# Patient Record
Sex: FEMALE | Race: WHITE | NOT HISPANIC OR LATINO | Employment: UNEMPLOYED | URBAN - METROPOLITAN AREA
[De-identification: names, ages, dates, MRNs, and addresses within clinical notes are randomized per-mention and may not be internally consistent; named-entity substitution may affect disease eponyms.]

---

## 2021-02-10 ENCOUNTER — OFFICE VISIT (OUTPATIENT)
Dept: CARDIOLOGY | Facility: CLINIC | Age: 41
End: 2021-02-10

## 2021-02-10 VITALS
BODY MASS INDEX: 37.69 KG/M2 | SYSTOLIC BLOOD PRESSURE: 110 MMHG | DIASTOLIC BLOOD PRESSURE: 90 MMHG | HEART RATE: 105 BPM | HEIGHT: 64 IN | WEIGHT: 220.8 LBS | OXYGEN SATURATION: 100 %

## 2021-02-10 DIAGNOSIS — E78.2 MIXED HYPERLIPIDEMIA: ICD-10-CM

## 2021-02-10 DIAGNOSIS — E66.3 OVERWEIGHT: ICD-10-CM

## 2021-02-10 DIAGNOSIS — R07.2 PRECORDIAL PAIN: Primary | ICD-10-CM

## 2021-02-10 PROCEDURE — 99203 OFFICE O/P NEW LOW 30 MIN: CPT | Performed by: INTERNAL MEDICINE

## 2021-02-10 PROCEDURE — 93000 ELECTROCARDIOGRAM COMPLETE: CPT | Performed by: INTERNAL MEDICINE

## 2021-02-10 RX ORDER — OMEPRAZOLE 40 MG/1
40 CAPSULE, DELAYED RELEASE ORAL DAILY
COMMUNITY
Start: 2021-01-20 | End: 2022-01-20

## 2021-02-10 RX ORDER — ONDANSETRON HYDROCHLORIDE 8 MG/1
8 TABLET, FILM COATED ORAL
COMMUNITY
Start: 2021-01-15

## 2021-02-10 RX ORDER — DULOXETIN HYDROCHLORIDE 20 MG/1
CAPSULE, DELAYED RELEASE ORAL
COMMUNITY

## 2021-02-10 RX ORDER — AMLODIPINE BESYLATE 2.5 MG/1
2.5 TABLET ORAL DAILY
Qty: 30 TABLET | Refills: 11 | Status: SHIPPED | OUTPATIENT
Start: 2021-02-10

## 2021-02-10 NOTE — PROGRESS NOTES
Date of Consultation: 02/10/2021    Patient Name: Jessica Healy  :1980    Encounter Provider:Dayton Cruz MD  Primary Care Provider: Hetal Causey MD    Place of Service: [facility] CARDIOLOGY    Chief Complaint   Patient presents with   • Chest Pain       History of Present Illness  The patient is a 40-year-old white female that is here today to discuss an abnormal stress test as well has her ongoing chest discomfort.    Approximately 6 weeks ago the patient noted the onset of left precordial chest discomfort.  She describes it predominantly as an aching sensation that occurs throughout the day.  It is not necessarily provoked by any exertional event.  The duration is variable lasting from a few minutes to much longer.  There is occasional radiation up into the neck with tingling in the jaw.  There is no associated diaphoresis or left arm radiation.  There is nothing that she does to relieve it.  Even sitting office she was complaining of the foot.    The patient has also noted progressive exertional dyspnea since January as well.  She states with any physical activity she just becomes fatigued and short of breath.  She was recently placed on Adderall for ADHD but this has since been discontinued because of significant side effects including hepatic dysfunction.  Some of her symptoms have improved off the medication but her chest discomfort still persists.    The patient was referred to the cardiology service at [facility].  She had a stress Cardiolite study which was abnormal indicating anterior ischemia suggesting traction of her left anterior descending coronary artery.  She was seen by one of the cardiologist there but was not pleased with the responses for the follow-up that she received.  She is here for a second opinion today.      Past Medical History:   Diagnosis Date   • Diabetes (CMS/HCC)    • Hyperlipidemia          Past Surgical  "History:   Procedure Laterality Date   • BREAST MASS EXCISION     •  SECTION     • CHOLECYSTECTOMY     • TONSILLECTOMY             Current Outpatient Medications:   •  omeprazole (priLOSEC) 40 MG capsule, Take 40 mg by mouth Daily., Disp: , Rfl:   •  ondansetron (ZOFRAN) 8 MG tablet, Take 8 mg by mouth., Disp: , Rfl:   •  DULoxetine (CYMBALTA) 20 MG capsule, Take  by mouth., Disp: , Rfl:       Social History     Socioeconomic History   • Marital status:      Spouse name: Not on file   • Number of children: Not on file   • Years of education: Not on file   • Highest education level: Not on file         Review of Systems   Constitution: Positive for malaise/fatigue.   HENT: Negative.    Eyes: Negative.    Cardiovascular: Positive for chest pain, dyspnea on exertion and palpitations.   Respiratory: Negative.    Endocrine: Negative.    Skin: Negative.    Musculoskeletal: Negative.    Gastrointestinal: Negative.    Neurological: Positive for light-headedness and paresthesias.   Psychiatric/Behavioral: Negative.        Procedures      ECG 12 Lead    Date/Time: 2/10/2021 12:11 PM  Performed by: Dayton Cruz MD  Authorized by: Dayton Cruz MD   Comparison: compared with previous ECG from 2021  Similar to previous ECG  Rhythm: sinus rhythm  Rate: normal  Conduction: conduction normal  QRS axis: normal                  Objective:    /90 (BP Location: Left arm, Patient Position: Sitting, Cuff Size: Adult)   Pulse 105   Ht 162.6 cm (64\")   Wt 100 kg (220 lb 12.8 oz)   SpO2 100%   BMI 37.90 kg/m²         Vitals signs reviewed.   Constitutional:       Appearance: Well-developed and overweight.   Eyes:      Pupils: Pupils are equal, round, and reactive to light.   HENT:      Head: Normocephalic.   Neck:      Musculoskeletal: Normal range of motion.      Thyroid: No thyromegaly.      Vascular: No carotid bruit or JVD.   Pulmonary:      Effort: Pulmonary effort is normal.      Breath " sounds: Normal breath sounds.   Cardiovascular:      Normal rate. Regular rhythm.      No gallop.   Pulses:     Intact distal pulses.   Edema:     Peripheral edema absent.   Skin:     General: Skin is warm and dry.      Findings: No erythema.   Neurological:      Mental Status: Alert and oriented to person, place, and time.             Assessment:       Diagnosis Plan   1. Precordial pain     2. Mixed hyperlipidemia     3. Overweight     1.  Chest pain: The patient's symptoms have some cardiac component.  There are other components that do not appear to be cardiac in nature.  I reviewed her Cardiolite report with her and my thoughts.  She mentioned that the [place] cardiologist suggested this was artifact and not true ischemia.  I have asked that [facility] send a copy of the disc so that I can personally review it to see if this is so.  I did explain to her that my index of suspicion however is low.  With that in mind I am going to try her on some amlodipine low dose to see if it makes a difference in how she feels.  We will give it a month or so.  He will allow me time to obtain her study and review it.  We talked about alternatives including a calcium score.  At this point I do not feel that this is urgent.  2.  Obesity: This is a major health risk for her.  At her young age getting her weight under control would be crucial for her regimen.  This should be a major focus for her.  3.  ADHD: Patient with continued concerns.  Did not tolerate Adderall.  Per primary care.       Plan:       1.  Amlodipine 2.5 mg daily  2.  Review study from [facility]  3.  Communicate back with the patient after review of the study.    I appreciate the opportunity to see this patient in consultation

## 2021-02-15 ENCOUNTER — TELEPHONE (OUTPATIENT)
Dept: CARDIOLOGY | Facility: CLINIC | Age: 41
End: 2021-02-15

## 2021-02-15 ENCOUNTER — TELEHEALTH PROVIDED OTHER THAN IN PATIENT'S HOME (OUTPATIENT)
Dept: URBAN - METROPOLITAN AREA TELEHEALTH 4 | Facility: TELEHEALTH | Age: 41
End: 2021-02-15
Payer: COMMERCIAL

## 2021-02-15 VITALS — WEIGHT: 217 LBS | HEIGHT: 64 IN

## 2021-02-15 DIAGNOSIS — R94.5 ABNORMAL RESULTS OF LIVER FUNCTION STUDIES: ICD-10-CM

## 2021-02-15 DIAGNOSIS — K30 FUNCTIONAL DYSPEPSIA: ICD-10-CM

## 2021-02-15 DIAGNOSIS — K59.00 CONSTIPATION, UNSPECIFIED: ICD-10-CM

## 2021-02-15 DIAGNOSIS — R19.4 CHANGE IN BOWEL HABIT: ICD-10-CM

## 2021-02-15 DIAGNOSIS — K62.5 HEMORRHAGE OF ANUS AND RECTUM: ICD-10-CM

## 2021-02-15 PROCEDURE — 99204 OFFICE O/P NEW MOD 45 MIN: CPT | Mod: 95 | Performed by: INTERNAL MEDICINE

## 2021-02-15 RX ORDER — POLYETHYLENE GLYCOL 3350 17 G/17G
17 POWDER, FOR SOLUTION ORAL
Qty: 510 | Refills: 11 | Status: COMPLETED
Start: 2021-02-15 | End: 2021-05-05

## 2021-02-15 NOTE — TELEPHONE ENCOUNTER
Dr Anthony Healy is calling to see if you were able to view the images on her stress test done at [facility]?    PH# [phone]    Thank you  Dena SCHWAB

## 2021-03-12 ENCOUNTER — ON CAMPUS - OUTPATIENT (OUTPATIENT)
Dept: URBAN - METROPOLITAN AREA HOSPITAL 77 | Facility: HOSPITAL | Age: 41
End: 2021-03-12
Payer: COMMERCIAL

## 2021-03-12 DIAGNOSIS — R94.5 ABNORMAL RESULTS OF LIVER FUNCTION STUDIES: ICD-10-CM

## 2021-03-12 DIAGNOSIS — K64.8 OTHER HEMORRHOIDS: ICD-10-CM

## 2021-03-12 DIAGNOSIS — K29.70 GASTRITIS, UNSPECIFIED, WITHOUT BLEEDING: ICD-10-CM

## 2021-03-12 DIAGNOSIS — R19.4 CHANGE IN BOWEL HABIT: ICD-10-CM

## 2021-03-12 DIAGNOSIS — K30 FUNCTIONAL DYSPEPSIA: ICD-10-CM

## 2021-03-12 DIAGNOSIS — K59.00 CONSTIPATION, UNSPECIFIED: ICD-10-CM

## 2021-03-12 PROCEDURE — 45380 COLONOSCOPY AND BIOPSY: CPT | Performed by: INTERNAL MEDICINE

## 2021-03-12 PROCEDURE — 43239 EGD BIOPSY SINGLE/MULTIPLE: CPT | Performed by: INTERNAL MEDICINE

## 2021-05-05 ENCOUNTER — OFFICE (OUTPATIENT)
Dept: URBAN - METROPOLITAN AREA CLINIC 64 | Facility: CLINIC | Age: 41
End: 2021-05-05

## 2021-05-05 VITALS
SYSTOLIC BLOOD PRESSURE: 109 MMHG | HEIGHT: 64 IN | HEART RATE: 105 BPM | DIASTOLIC BLOOD PRESSURE: 80 MMHG | WEIGHT: 226 LBS

## 2021-05-05 DIAGNOSIS — R94.5 ABNORMAL RESULTS OF LIVER FUNCTION STUDIES: ICD-10-CM

## 2021-05-05 DIAGNOSIS — K30 FUNCTIONAL DYSPEPSIA: ICD-10-CM

## 2021-05-05 DIAGNOSIS — K64.8 OTHER HEMORRHOIDS: ICD-10-CM

## 2021-05-05 PROCEDURE — 99214 OFFICE O/P EST MOD 30 MIN: CPT | Performed by: INTERNAL MEDICINE

## 2021-06-08 ENCOUNTER — OFFICE (OUTPATIENT)
Dept: URBAN - METROPOLITAN AREA CLINIC 64 | Facility: CLINIC | Age: 41
End: 2021-06-08

## 2021-06-08 VITALS — HEIGHT: 64 IN

## 2021-06-08 DIAGNOSIS — K64.8 OTHER HEMORRHOIDS: ICD-10-CM

## 2021-06-08 DIAGNOSIS — K64.0 FIRST DEGREE HEMORRHOIDS: ICD-10-CM

## 2021-06-08 PROCEDURE — 46221 LIGATION OF HEMORRHOID(S): CPT | Performed by: INTERNAL MEDICINE

## 2021-07-02 ENCOUNTER — OFFICE (OUTPATIENT)
Dept: URBAN - METROPOLITAN AREA CLINIC 64 | Facility: CLINIC | Age: 41
End: 2021-07-02

## 2021-07-02 VITALS — HEIGHT: 64 IN

## 2021-07-02 DIAGNOSIS — K64.0 FIRST DEGREE HEMORRHOIDS: ICD-10-CM

## 2021-07-02 PROCEDURE — 46221 LIGATION OF HEMORRHOID(S): CPT | Performed by: INTERNAL MEDICINE

## 2021-07-27 ENCOUNTER — OFFICE (OUTPATIENT)
Dept: URBAN - METROPOLITAN AREA CLINIC 64 | Facility: CLINIC | Age: 41
End: 2021-07-27
Payer: COMMERCIAL

## 2021-07-27 VITALS — HEIGHT: 64 IN

## 2021-07-27 DIAGNOSIS — K64.0 FIRST DEGREE HEMORRHOIDS: ICD-10-CM

## 2021-07-27 PROCEDURE — 46221 LIGATION OF HEMORRHOID(S): CPT | Performed by: INTERNAL MEDICINE

## 2021-09-21 ENCOUNTER — TRANSCRIBE ORDERS (OUTPATIENT)
Dept: PHYSICAL THERAPY | Facility: CLINIC | Age: 41
End: 2021-09-21

## 2021-09-21 DIAGNOSIS — M25.551 RIGHT HIP PAIN: Primary | ICD-10-CM

## 2021-10-25 ENCOUNTER — TREATMENT (OUTPATIENT)
Dept: PHYSICAL THERAPY | Facility: CLINIC | Age: 41
End: 2021-10-25

## 2021-10-25 DIAGNOSIS — M25.551 RIGHT HIP PAIN: Primary | ICD-10-CM

## 2021-10-25 DIAGNOSIS — R26.2 DIFFICULTY WALKING: ICD-10-CM

## 2021-10-25 PROCEDURE — 97110 THERAPEUTIC EXERCISES: CPT | Performed by: PHYSICAL THERAPIST

## 2021-10-25 PROCEDURE — 97162 PT EVAL MOD COMPLEX 30 MIN: CPT | Performed by: PHYSICAL THERAPIST

## 2021-10-25 NOTE — PROGRESS NOTES
Physical Therapy Initial Evaluation and Plan of Care      Patient: Jessica Healy   : 1980  Diagnosis/ICD-10 Code:  Right hip pain [M25.551]  Referring practitioner: JAMIR Medina  Date of Initial Visit: 10/25/2021  Today's Date: 10/25/2021  Patient seen for 1 sessions           Subjective Evaluation    History of Present Illness  Mechanism of injury: Patient presents with complains of R hip pain. About 6 weeks ago patient was diagnosed with Ankylosing spondylitis which has been affecting her hips, tailbone, and upper back. She was given Cosentix  and prednisone and she is starting to feel a little better.     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 She resigned at the end of February because she wanted to figure out the cause of her symptoms. She dealt with gastrologists and cardiologists. Was diagnosed with fatty liver, gastritis and diverticulitis. Her symptoms were still not fully explained. By May she started hurting all over. She also had been on some medications for her mental health which she is now off of. Finally it was a rheumatologist who diagnosed her with the ankylosis spondylitis and fibromyalgia.     Pain is worse with hip rotation, lifting, walking >5-10 min    PMH: diverticulitis, RA, fibromyalgia, diabetes    PLOF: Current gym member (does water wellness class 1-2x/week). Used to enjoy doing karate with her son      Patient Occupation: Used to work in IT, currently unemployed Pain  Current pain ratin  At best pain ratin  At worst pain ratin  Location: R hip/groin, upper back   Quality: sharp  Relieving factors: rest, support, change in position, relaxation, medications and ice  Aggravating factors: ambulation, stairs, standing, squatting, repetitive movement, lifting, movement and prolonged positioning  Progression: improved    Social Support  Lives in:  multiple-level home  Lives with: spouse and young children    Hand dominance: left    Diagnostic Tests  X-ray: abnormal    Treatments  Previous treatment: medication  Patient Goals  Patient goals for therapy: increased strength, return to sport/leisure activities, decreased pain, return to work, increased motion and improved balance  Patient goal: to be able to do karate with her son (8 year old)           Objective          Neurological Testing     Sensation     Lumbar   Left   Intact: light touch    Right   Intact: light touch    Active Range of Motion   Left Shoulder   Flexion: WFL    Right Shoulder   Flexion: WFL  Left Hip   Flexion: 100 degrees   Abduction: 39 degrees   External rotation (90/90): 65 degrees   Internal rotation (90/90): 35 degrees     Right Hip   Flexion: 102 degrees   External rotation (90/90): 29 degrees with pain  Internal rotation (90/90): 34 degrees     Strength/Myotome Testing     Left Shoulder     Planes of Motion   Flexion: 4     Right Shoulder     Planes of Motion   Flexion: 4-     Left Hip   Planes of Motion   Flexion: 3+  Abduction: 3+  External rotation: 3+  Internal rotation: 4-    Right Hip   Planes of Motion   Flexion: 3  Abduction: 3  External rotation: 3+  Internal rotation: 3+    Left Knee   Flexion: 4+  Extension: 4+    Right Knee   Flexion: 4  Extension: 3+    Tests     Left Hip   Negative JOSE and scour.   SLR: Negative.     Right Hip   Positive JOSE and scour.   SLR: Positive.         See Exercise, Manual, and Modality Logs for complete treatment.       Functional Outcome Score: LEFS:  33/80    EXERCISES:  -SLR 2x20  -Sidelying clamshells 2x20  -Butterfly stretch 1 min x 2    Assessment & Plan     Assessment  Impairments: abnormal gait, abnormal muscle firing, abnormal or restricted ROM, activity intolerance, impaired balance, impaired physical strength, lacks appropriate home exercise program and pain with function  Assessment details: Jessica Healy is a 41 y.o. year-old  female referred to physical therapy for R hip pain and upper back pain due to ankylosis spondylitis. She presents with a evolving clinical presentation.  She has comorbidities of diabetes, fibromyalgia, and gastritis and personal factors of history of mental health issues which may affect her progress in the plan of care.  Signs and symptoms are consistent with physical therapy diagnosis of R hip pain and difficulty walking. Patient is appropriate for skilled physical therapy in order to reduce pain and increase ease with daily mobility.   Barriers to therapy: none identified  Prognosis: good  Functional Limitations: lifting, sleeping, walking, uncomfortable because of pain, sitting and standing  Goals  Plan Goals: STGs to be completed within 30 days:  -Patient will demonstrate compliance and independence with initial HEP  -Patient will increase R hip ER AROM to 45 degrees to help increase ease with putting on shoes/socks  -Patient will perform sit to stand transfer with equilateral WB and no UE assistance    LTGs to be completed within 90 days:  -Patient will increase R gip abduction strength to 4-/5 or more to increase hip stability with gait.  -Patient will complete community mobility 20 min or more to help patient ambulate for leisure/exercise  -Patient will improve score on LEFS from 33 at eval to 43 or greater to improve quality of life    Plan  Therapy options: will be seen for skilled physical therapy services  Planned modality interventions: TENS, ultrasound, electrical stimulation/Russian stimulation, dry needling, traction and hydrotherapy  Planned therapy interventions: joint mobilization, stretching, strengthening, therapeutic activities, transfer training, postural training, manual therapy, ADL retraining, balance/weight-bearing training, dressing changes, flexibility, functional ROM exercises, gait training, home exercise program, neuromuscular re-education, motor coordination training and  spinal/joint mobilization  Other planned therapy interventions: Aquatic Therapy  Frequency: 2x week (36 visits)  Treatment plan discussed with: patient  Plan details: Gait training, stairs, Balance, Hip ROM/strength, LE stability        Timed:  Manual Therapy:         mins  20063;  Therapeutic Exercise:    9     mins  49070;     Neuromuscular Renny:        mins  83385;    Therapeutic Activity:          mins  89990;     Gait Training:           mins  84805;     Ultrasound:          mins  61264;    Electrical Stimulation:         mins  65709 ( );  Iontophoresis         mins 64038  Dry Needling        mins      Untimed:  Electrical Stimulation:         mins  67926 ( );  Mechanical Traction:         mins  98185;     Timed Treatment:   9   mins   Total Treatment:     30   mins    PT SIGNATURE: Aliya Esparza PT   DATE TREATMENT INITIATED: 10/25/2021    Initial Certification  Certification Period: 1/23/2022  I certify that the therapy services are furnished while this patient is under my care.  The services outlined above are required by this patient, and will be reviewed every 90 days.     PHYSICIAN: Manuela Gaines APRN      DATE:     Please sign and return via fax to [phone] Thank you, [facility].

## 2021-10-27 ENCOUNTER — TREATMENT (OUTPATIENT)
Dept: PHYSICAL THERAPY | Facility: CLINIC | Age: 41
End: 2021-10-27

## 2021-10-27 DIAGNOSIS — M25.551 RIGHT HIP PAIN: Primary | ICD-10-CM

## 2021-10-27 DIAGNOSIS — R26.2 DIFFICULTY WALKING: ICD-10-CM

## 2021-10-27 PROCEDURE — 97110 THERAPEUTIC EXERCISES: CPT | Performed by: PHYSICAL THERAPIST

## 2021-10-27 NOTE — PROGRESS NOTES
Physical Therapy Daily Progress Note    Patient: Jessica Healy   : 1980  Diagnosis/ICD-10 Code:  Right hip pain [M25.551]  Referring practitioner: DANIELA Medina*  Date of Initial Visit: Type: THERAPY  Noted: 10/25/2021  Today's Date: 10/27/2021  Patient seen for 2 sessions           Subjective   Patient states she is dealing with pain near her tailbone.    Objective   See Exercise, Manual, and Modality Logs for complete treatment.     EXERCISES:  -Recumbent bike,5 min  -Cage stretch for groin and hamstrings in standing, 30-45 sec B  -6K Hip abduction, 20 lbs, 3x10  -5K Hip adduction, 40 lbs, 3x10  -Standing rows at cable, 12.5 lbs, 2x10  -SLR 2x20 *defer  -PPT 20x  -Supine marches x10 B  -LTR 20x  -Sidelying clamshells 2x20 *defer  -Butterfly stretch 1 min x 2 *defer    Assessment/Plan  First full treatment today. Patient reports the bike helped loosen up her hip muscles but she has trouble performing standing exercises due to pain with R LE weightbearing. Performed all exercises slowly to minimize exacerbation of pain.         Timed:    Manual Therapy:         mins  73572;  Therapeutic Exercise:    39     mins  84860;     Neuromuscular Renny:        mins  38202;    Therapeutic Activity:          mins  68950;     Gait Training:           mins  57533;     Ultrasound:          mins  29377;    Electrical Stimulation:         mins  01843 ( );  Iontophoresis         mins 22739;  Aquatic Therapy         mins 50755;  Dry Needling                   mins    Untimed:  Electrical Stimulation:         mins  99688 ( );  Mechanical Traction:         mins  16390;     Timed Treatment:   39   mins   Total Treatment:     39   mins  Aliya Esparza PT  Physical Therapist

## 2021-11-01 ENCOUNTER — TREATMENT (OUTPATIENT)
Dept: PHYSICAL THERAPY | Facility: CLINIC | Age: 41
End: 2021-11-01

## 2021-11-01 DIAGNOSIS — M25.551 RIGHT HIP PAIN: Primary | ICD-10-CM

## 2021-11-01 DIAGNOSIS — R26.2 DIFFICULTY WALKING: ICD-10-CM

## 2021-11-01 PROCEDURE — 97110 THERAPEUTIC EXERCISES: CPT | Performed by: PHYSICAL THERAPIST

## 2021-11-01 NOTE — PROGRESS NOTES
Physical Therapy Daily Progress Note    Patient: Jessica Healy   : 1980  Diagnosis/ICD-10 Code:  Right hip pain [M25.551]  Referring practitioner: DANIELA Medina*  Date of Initial Visit: Type: THERAPY  Noted: 10/25/2021  Today's Date: 2021  Patient seen for 3 sessions           Subjective   Patient reports she slept 2 hours after her PT session last week because she was so fatigued.     Objective   See Exercise, Manual, and Modality Logs for complete treatment.        EXERCISES:  -Recumbent bike,5 min  -Cage stretch for groin and hamstrings in standing, 30-45 sec B  -6K Hip abduction, 20 lbs, 3x10  -5K Hip adduction, 40 lbs, 3x10  -Standing rows at cable, 12.5 lbs, 2x10  -SLR 10x  -PPT 20x  -Supine marches x10 B  -LTR 20x *defer  -Sidelying clamshells 2x20 *defer  -Butterfly stretch 1 min x 2        Assessment/Plan  Patient is limited in tolerance to SLR exercise due to R groin pain. Continued to focus on gentle stretching and core stabilization. Overall her ROM is much better than it was a few weeks ago but she is still dealing with a lot of stiffness/tightness.          Timed:    Manual Therapy:         mins  97530;  Therapeutic Exercise:    40     mins  30145;     Neuromuscular Renny:        mins  50462;    Therapeutic Activity:          mins  99948;     Gait Training:           mins  50251;     Ultrasound:          mins  30932;    Electrical Stimulation:         mins  31905 ( );  Iontophoresis         mins 08844;  Aquatic Therapy         mins 62359;  Dry Needling                   mins    Untimed:  Electrical Stimulation:         mins  00836 ( );  Mechanical Traction:         mins  00910;     Timed Treatment:   40   mins   Total Treatment:     40   mins  Aliya Esparza, NANAMARIE  Physical Therapist

## 2021-11-03 ENCOUNTER — TREATMENT (OUTPATIENT)
Dept: PHYSICAL THERAPY | Facility: CLINIC | Age: 41
End: 2021-11-03

## 2021-11-03 DIAGNOSIS — M25.551 RIGHT HIP PAIN: Primary | ICD-10-CM

## 2021-11-03 DIAGNOSIS — R26.2 DIFFICULTY WALKING: ICD-10-CM

## 2021-11-03 PROCEDURE — 97110 THERAPEUTIC EXERCISES: CPT | Performed by: PHYSICAL THERAPIST

## 2021-11-03 NOTE — PROGRESS NOTES
Physical Therapy Daily Progress Note    Patient: Jessica Healy   : 1980  Diagnosis/ICD-10 Code:  Right hip pain [M25.551]  Referring practitioner: DANIELA Medina*  Date of Initial Visit: Type: THERAPY  Noted: 10/25/2021  Today's Date: 11/3/2021  Patient seen for 4 sessions           Subjective   Patient reports she is still fatigued after therapy however she was able to get more chores done yesterday.    Objective   See Exercise, Manual, and Modality Logs for complete treatment.     EXERCISES:  -Recumbent bike,5 min  -Cage stretch for groin and hamstrings in standing, 30-45 sec B  -6K Hip abduction, 20 lbs, 3x10  -5K Hip adduction, 40 lbs, 3x10  -Standing rows at cable, 12.5 lbs, 2x10 *defer  -SLR 10x  -PPT 20x  -Supine marches x10 B  -LTR 20x   -Sidelying clamshells 2x20   -Butterfly stretch 1 min x 2     Assessment/Plan  Patient is still dealing with a lot of tightness in her groin. Recommended she continue to perform sustained stretches at home. She also has some plantar fascitis pain and would benefit from orthotics eval. Slightly better core activation with PPT today but patient is still working on avoiding breath holding.         Timed:    Manual Therapy:         mins  83258;  Therapeutic Exercise:    40     mins  94576;     Neuromuscular Renny:        mins  96420;    Therapeutic Activity:          mins  20634;     Gait Training:           mins  83181;     Ultrasound:          mins  57649;    Electrical Stimulation:         mins  49695 ( );  Iontophoresis         mins 45591;  Aquatic Therapy         mins 63106;  Dry Needling                   mins    Untimed:  Electrical Stimulation:         mins  40241 ( );  Mechanical Traction:         mins  95106;     Timed Treatment:   40   mins   Total Treatment:     40   mins  Aliya Esparza PT  Physical Therapist

## 2021-11-05 ENCOUNTER — OFFICE (OUTPATIENT)
Dept: URBAN - METROPOLITAN AREA CLINIC 64 | Facility: CLINIC | Age: 41
End: 2021-11-05
Payer: COMMERCIAL

## 2021-11-05 VITALS
WEIGHT: 216 LBS | HEIGHT: 64 IN | DIASTOLIC BLOOD PRESSURE: 82 MMHG | HEART RATE: 88 BPM | SYSTOLIC BLOOD PRESSURE: 138 MMHG

## 2021-11-05 DIAGNOSIS — R94.5 ABNORMAL RESULTS OF LIVER FUNCTION STUDIES: ICD-10-CM

## 2021-11-05 PROCEDURE — 99213 OFFICE O/P EST LOW 20 MIN: CPT | Performed by: INTERNAL MEDICINE

## 2021-11-05 RX ORDER — HYDROCORTISONE 25 MG/G
5 CREAM TOPICAL
Qty: 1 | Refills: 1 | Status: ACTIVE
Start: 2021-11-05

## 2021-11-19 ENCOUNTER — TELEPHONE (OUTPATIENT)
Dept: PHYSICAL THERAPY | Facility: CLINIC | Age: 41
End: 2021-11-19

## 2021-11-29 ENCOUNTER — TREATMENT (OUTPATIENT)
Dept: PHYSICAL THERAPY | Facility: CLINIC | Age: 41
End: 2021-11-29

## 2021-11-29 DIAGNOSIS — M25.551 RIGHT HIP PAIN: Primary | ICD-10-CM

## 2021-11-29 DIAGNOSIS — R26.2 DIFFICULTY WALKING: ICD-10-CM

## 2021-11-29 PROCEDURE — 97110 THERAPEUTIC EXERCISES: CPT | Performed by: PHYSICAL THERAPIST

## 2021-11-29 NOTE — PROGRESS NOTES
30-Day / 10-Visit Progress Note         Patient: Jessica Healy   : 1980  Diagnosis/ICD-10 Code:  Right hip pain [M25.551]  Referring practitioner: JAMIR Medina  Date of Initial Visit: Type: THERAPY  Noted: 10/25/2021  Today's Date: 2021  Patient seen for 5 sessions      Subjective:     Clinical Progress: improved  Home Program Compliance: Yes  Treatment has included:  therapeutic exercise    Subjective   Patient reports her pain is significantly better. She no longer has tailbone pain with sitting and her R ankle pain has subsided as well. She attributes this improvement to her injections (cosentix)      Objective     EXERCISES:  -Recumbent bike,5 min  -Cage stretch for groin and hamstrings in standing, 30-45 sec B *defer  -6K Hip abduction, 20 lbs, 3x10  -5K Hip adduction, 40 lbs, 3x10  -Standing rows at cable, 12.5 lbs, 2x10  -SLR 10x  -PPT 20x  -Supine marches x10 B  -LTR 20x   -Sidelying clamshells 2x20   -Butterfly stretch 1 min x 2        Active Range of Motion   Left Shoulder   Flexion: WFL    Right Shoulder   Flexion: WFL    Left Hip   Flexion: 100 degrees   Abduction: 30 degrees   External rotation (90/90): 26 degrees   Internal rotation (90/90): 30 degrees      Right Hip   Flexion: 103 degrees   External rotation (90/90): 26 degrees with pain  Internal rotation (90/90): 30 degrees       Strength/Myotome Testing     Left Shoulder      Planes of Motion   Flexion: 4     Right Shoulder      Planes of Motion   Flexion: 4     Left Hip   Planes of Motion   Flexion: 4-  Abduction: 3+  External rotation: 3+  Internal rotation: 4-     Right Hip   Planes of Motion   Flexion: 4-  Abduction: 4-  External rotation: 3+  Internal rotation: 3+     Left Knee   Flexion: 4+  Extension: 4+     Right Knee   Flexion: 4+  Extension: 4           See Exercise, Manual, and Modality Logs for complete treatment.         Functional Outcome Score: LEFS:  43/80    Assessment/Plan     Jessica Healy has been seen  for 5 physical therapy sessions for R hip pain and upper back pain due to ankylosis spondylitis.  Treatment has included therapeutic exercise. Progress to physical therapy goals is good. Patient's pain level has significantly improved since starting therapy but her hip ROM is still limited. Functionally she reports increased ease bending down and donning shoes/socks. Walking tolerance has progressed to 10 minutes. She will benefit from continued skilled physical therapy to address remaining impairments and functional limitations.      STGs to be completed within 30 days:  -Patient will demonstrate compliance and independence with initial HEP (MET)  -Patient will increase R hip ER AROM to 45 degrees to help increase ease with putting on shoes/socks (ONGOING)  -Patient will perform sit to stand transfer with equilateral WB and no UE assistance (MET)    LTGs to be completed within 90 days:  -Patient will increase R hip abduction strength to 4-/5 or more to increase hip stability with gait. (MET)  -Patient will complete community mobility 20 min or more to help patient ambulate for leisure/exercise  (ONGOING, can now do 10)  -Patient will improve score on LEFS from 33 at eval to 43 or greater to improve quality of life (MET)        Recommendations: Continue as planned  Timeframe: 1 month; 2x/week  Prognosis to achieve goals: good    PT Signature: Aliya Esparza PT    License Number: [id number]    Electronically signed by Aliya Esparza PT, 11/29/21, 9:16 AM EST      Based upon review of the patient's progress and continued therapy plan, it is my medical opinion that Jessica Healy should continue physical therapy treatment at [facility] PH[facility] TH[facility]  [address] [place] [address] [address] [place] [address]  [phone].    Signature: __________________________________  Manuela Gaines APRN    Timed:  Manual Therapy:         mins  75249;  Therapeutic Exercise:    40     mins  83800;      Neuromuscular Renny:        mins  04949;    Therapeutic Activity:          mins  90661;     Gait Training:           mins  21411;     Ultrasound:          mins  89399;    Iontophoresis         mins 89406;  Dry Needling                   mins [phone]/20561 (Self-pay)    Untimed:  Electrical Stimulation:         mins  71688 ( );  Traction:         mins  19902;     Timed Treatment:   40   mins   Total Treatment:     40   mins

## 2022-01-11 ENCOUNTER — DOCUMENTATION (OUTPATIENT)
Dept: PHYSICAL THERAPY | Facility: CLINIC | Age: 42
End: 2022-01-11

## 2022-01-11 DIAGNOSIS — R26.2 DIFFICULTY WALKING: Primary | ICD-10-CM

## 2022-01-11 DIAGNOSIS — M25.551 RIGHT HIP PAIN: ICD-10-CM

## 2022-09-19 ENCOUNTER — TRANSCRIBE ORDERS (OUTPATIENT)
Dept: ADMINISTRATIVE | Facility: HOSPITAL | Age: 42
End: 2022-09-19

## 2022-09-19 DIAGNOSIS — Z79.52 LONG TERM (CURRENT) USE OF SYSTEMIC STEROIDS: ICD-10-CM

## 2022-09-19 DIAGNOSIS — Z79.52 LONG TERM SYSTEMIC STEROID USER: Primary | ICD-10-CM

## 2022-09-23 ENCOUNTER — APPOINTMENT (OUTPATIENT)
Dept: BONE DENSITY | Facility: HOSPITAL | Age: 42
End: 2022-09-23

## 2022-09-23 DIAGNOSIS — Z79.52 LONG TERM SYSTEMIC STEROID USER: ICD-10-CM

## 2022-09-23 PROCEDURE — 77080 DXA BONE DENSITY AXIAL: CPT

## 2024-07-15 ENCOUNTER — HOSPITAL ENCOUNTER (EMERGENCY)
Facility: HOSPITAL | Age: 44
Discharge: HOME | End: 2024-07-15
Attending: EMERGENCY MEDICINE
Payer: COMMERCIAL

## 2024-07-15 ENCOUNTER — APPOINTMENT (OUTPATIENT)
Dept: CT IMAGING | Facility: HOSPITAL | Age: 44
End: 2024-07-15
Payer: COMMERCIAL

## 2024-07-15 VITALS
HEART RATE: 74 BPM | DIASTOLIC BLOOD PRESSURE: 88 MMHG | HEIGHT: 64 IN | WEIGHT: 225 LBS | TEMPERATURE: 98 F | BODY MASS INDEX: 38.41 KG/M2 | OXYGEN SATURATION: 98 % | RESPIRATION RATE: 18 BRPM | SYSTOLIC BLOOD PRESSURE: 140 MMHG

## 2024-07-15 DIAGNOSIS — L03.211 CELLULITIS OF CHIN: ICD-10-CM

## 2024-07-15 DIAGNOSIS — R53.83 OTHER FATIGUE: Primary | ICD-10-CM

## 2024-07-15 LAB
ALBUMIN SERPL-MCNC: 4.3 G/DL (ref 3.5–5.2)
ALBUMIN/GLOB SERPL: 1.3 G/DL
ALP SERPL-CCNC: 63 U/L (ref 39–117)
ALT SERPL W P-5'-P-CCNC: 33 U/L (ref 1–33)
ANION GAP SERPL CALCULATED.3IONS-SCNC: 10.7 MMOL/L (ref 5–15)
AST SERPL-CCNC: 42 U/L (ref 1–32)
BACTERIA UR QL AUTO: ABNORMAL /HPF
BASOPHILS # BLD AUTO: 0.04 10*3/MM3 (ref 0–0.2)
BASOPHILS NFR BLD AUTO: 0.7 % (ref 0–1.5)
BILIRUB SERPL-MCNC: 0.4 MG/DL (ref 0–1.2)
BILIRUB UR QL STRIP: NEGATIVE
BUN SERPL-MCNC: 6 MG/DL (ref 6–20)
BUN/CREAT SERPL: 6.7 (ref 7–25)
CALCIUM SPEC-SCNC: 9.4 MG/DL (ref 8.6–10.5)
CHLORIDE SERPL-SCNC: 101 MMOL/L (ref 98–107)
CLARITY UR: CLEAR
CO2 SERPL-SCNC: 24.3 MMOL/L (ref 22–29)
COLOR UR: YELLOW
CREAT SERPL-MCNC: 0.89 MG/DL (ref 0.57–1)
DEPRECATED RDW RBC AUTO: 42.7 FL (ref 37–54)
EGFRCR SERPLBLD CKD-EPI 2021: 83.1 ML/MIN/1.73
EOSINOPHIL # BLD AUTO: 0.24 10*3/MM3 (ref 0–0.4)
EOSINOPHIL NFR BLD AUTO: 4.2 % (ref 0.3–6.2)
ERYTHROCYTE [DISTWIDTH] IN BLOOD BY AUTOMATED COUNT: 12.7 % (ref 12.3–15.4)
GLOBULIN UR ELPH-MCNC: 3.3 GM/DL
GLUCOSE SERPL-MCNC: 94 MG/DL (ref 65–99)
GLUCOSE UR STRIP-MCNC: NEGATIVE MG/DL
HCT VFR BLD AUTO: 43.3 % (ref 34–46.6)
HGB BLD-MCNC: 13.9 G/DL (ref 12–15.9)
HGB UR QL STRIP.AUTO: ABNORMAL
HYALINE CASTS UR QL AUTO: ABNORMAL /LPF
IMM GRANULOCYTES # BLD AUTO: 0.01 10*3/MM3 (ref 0–0.05)
IMM GRANULOCYTES NFR BLD AUTO: 0.2 % (ref 0–0.5)
KETONES UR QL STRIP: NEGATIVE
LEUKOCYTE ESTERASE UR QL STRIP.AUTO: NEGATIVE
LYMPHOCYTES # BLD AUTO: 2.36 10*3/MM3 (ref 0.7–3.1)
LYMPHOCYTES NFR BLD AUTO: 40.8 % (ref 19.6–45.3)
MCH RBC QN AUTO: 28.9 PG (ref 26.6–33)
MCHC RBC AUTO-ENTMCNC: 32.1 G/DL (ref 31.5–35.7)
MCV RBC AUTO: 90 FL (ref 79–97)
MONOCYTES # BLD AUTO: 0.58 10*3/MM3 (ref 0.1–0.9)
MONOCYTES NFR BLD AUTO: 10 % (ref 5–12)
NEUTROPHILS NFR BLD AUTO: 2.55 10*3/MM3 (ref 1.7–7)
NEUTROPHILS NFR BLD AUTO: 44.1 % (ref 42.7–76)
NITRITE UR QL STRIP: NEGATIVE
PH UR STRIP.AUTO: 7 [PH] (ref 4.5–8)
PLATELET # BLD AUTO: 308 10*3/MM3 (ref 140–450)
PMV BLD AUTO: 9.1 FL (ref 6–12)
POTASSIUM SERPL-SCNC: 4.7 MMOL/L (ref 3.5–5.2)
PROT SERPL-MCNC: 7.6 G/DL (ref 6–8.5)
PROT UR QL STRIP: NEGATIVE
RBC # BLD AUTO: 4.81 10*6/MM3 (ref 3.77–5.28)
RBC # UR STRIP: ABNORMAL /HPF
REF LAB TEST METHOD: ABNORMAL
SODIUM SERPL-SCNC: 136 MMOL/L (ref 136–145)
SP GR UR STRIP: 1.01 (ref 1–1.03)
SQUAMOUS #/AREA URNS HPF: ABNORMAL /HPF
TSH SERPL DL<=0.05 MIU/L-ACNC: 1.14 UIU/ML (ref 0.27–4.2)
UROBILINOGEN UR QL STRIP: ABNORMAL
WBC # UR STRIP: ABNORMAL /HPF
WBC NRBC COR # BLD AUTO: 5.78 10*3/MM3 (ref 3.4–10.8)

## 2024-07-15 PROCEDURE — 80050 GENERAL HEALTH PANEL: CPT | Performed by: EMERGENCY MEDICINE

## 2024-07-15 PROCEDURE — 99285 EMERGENCY DEPT VISIT HI MDM: CPT

## 2024-07-15 PROCEDURE — 81001 URINALYSIS AUTO W/SCOPE: CPT | Performed by: EMERGENCY MEDICINE

## 2024-07-15 PROCEDURE — 25510000001 IOPAMIDOL PER 1 ML: Performed by: EMERGENCY MEDICINE

## 2024-07-15 PROCEDURE — 70491 CT SOFT TISSUE NECK W/DYE: CPT

## 2024-07-15 RX ORDER — SODIUM CHLORIDE 0.9 % (FLUSH) 0.9 %
10 SYRINGE (ML) INJECTION AS NEEDED
Status: DISCONTINUED | OUTPATIENT
Start: 2024-07-15 | End: 2024-07-15 | Stop reason: HOSPADM

## 2024-07-15 RX ORDER — CLINDAMYCIN HYDROCHLORIDE 150 MG/1
450 CAPSULE ORAL EVERY 8 HOURS
Qty: 63 CAPSULE | Refills: 0 | Status: SHIPPED | OUTPATIENT
Start: 2024-07-15 | End: 2024-07-22

## 2024-07-15 RX ADMIN — IOPAMIDOL 100 ML: 755 INJECTION, SOLUTION INTRAVENOUS at 12:32

## 2024-07-15 NOTE — ED PROVIDER NOTES
Subjective   History of Present Illness  Patient presents with multiple complaints that have been occurring over the last few months over the last few days.  Patient's been having some abnormal menstrual cycles and feeling like her hormones might be off and possibly perimenopausal but she does not have a gynecology and it is going to be a few months for the see her.  Patient said she passed a large brownish blood clot 2 days ago.  Patient says that her menstrual cycles have been abnormal for the last few months.  Patient also reports feeling exhausted and washed out for the last week.  Patient's also had a 2-day history of headache where her head feels like it is in a vice.  Patient can take some medicine it does improve.  Distantly patient had some swelling underneath her chin and some jaw pain.  Patient denies any difficulty breathing or swallowing.  Patient was seen at urgent care and sent here for further evaluation.  No call from urgent care.  No therapy taken prior to arrival.  Patient says nothing seems to make her symptoms better or worse.      Review of Systems   All other systems reviewed and are negative.      Past Medical History:   Diagnosis Date    Allergic     Anxiety     Arthritis     Asthma     Depression     Diabetes     Headache     Hyperlipidemia     Liver disease     Shortness of breath        Allergies   Allergen Reactions    Oxycodone Shortness Of Breath     Throat gets tight.  Throat gets tight.      Atorvastatin Other (See Comments)     Chest pain    Penicillins Rash and Unknown - High Severity    Sulfa Antibiotics Other (See Comments)       Past Surgical History:   Procedure Laterality Date    BREAST MASS EXCISION       SECTION      CHOLECYSTECTOMY      TONSILLECTOMY         History reviewed. No pertinent family history.    Social History     Socioeconomic History    Marital status:    Tobacco Use    Smoking status: Never    Smokeless tobacco: Never   Vaping Use    Vaping  status: Never Used   Substance and Sexual Activity    Alcohol use: Never    Drug use: Never    Sexual activity: Defer           Objective   Physical Exam  Vitals and nursing note reviewed.   Constitutional:       Comments: Patient sitting in bed comfortably, talkative, friendly, no signs of distress.  Cooperative with exam.   HENT:      Head: Normocephalic.      Comments: Underneath patient's central chin she has an area of swelling and tenderness to palpation with mild erythema.  No surrounding lymphadenopathy.     Right Ear: External ear normal.      Left Ear: External ear normal.      Nose: Nose normal.      Mouth/Throat:      Pharynx: Oropharynx is clear.   Eyes:      Conjunctiva/sclera: Conjunctivae normal.   Cardiovascular:      Rate and Rhythm: Normal rate and regular rhythm.      Heart sounds: Normal heart sounds.   Pulmonary:      Effort: Pulmonary effort is normal.      Breath sounds: Normal breath sounds.   Abdominal:      General: There is no distension.      Palpations: Abdomen is soft.      Tenderness: There is no abdominal tenderness.   Musculoskeletal:         General: No swelling. Normal range of motion.      Cervical back: Neck supple.   Skin:     General: Skin is warm and dry.      Capillary Refill: Capillary refill takes 2 to 3 seconds.   Neurological:      Mental Status: She is alert and oriented to person, place, and time.   Psychiatric:         Mood and Affect: Mood normal.         Behavior: Behavior normal.         Procedures           ED Course                                             Medical Decision Making  DDX cellulitis, electrolyte abnormality, thyroid issue    CT Soft Tissue Neck With Contrast    Result Date: 7/15/2024  Impression: 1.Mild infiltration in subcutaneous tissues along mid anterior neck in area of concern, which suggest nonspecific inflammation. 2.Otherwise, no acute process identified within neck. Electronically Signed: Ryley Castaneda MD  7/15/2024 1:01 PM EDT   Workstation ID: [id number]    Labs Reviewed  COMPREHENSIVE METABOLIC PANEL - Abnormal; Notable for the following components:     AST (SGOT)                    42 (*)                 BUN/Creatinine Ratio          6.7 (*)             All other components within normal limits         Narrative: GFR Normal >60                  Chronic Kidney Disease <60                  Kidney Failure <15                    URINALYSIS W/ MICROSCOPIC IF INDICATED (NO CULTURE) - Abnormal; Notable for the following components:     Blood, UA                     Large (3+) (*)            All other components within normal limits  URINALYSIS, MICROSCOPIC ONLY - Abnormal; Notable for the following components:     RBC, UA                       11-20 (*)            All other components within normal limits  TSH - Normal  CBC WITH AUTO DIFFERENTIAL - Normal  CBC AND DIFFERENTIAL    1420 Pt seen again prior to d/c.  Labs/Imaging reviewed and are unremarkable except for the inflammation under chin.  Vitals stable and pt. in NAD. Non-toxic. Comfortable. Ambulating without difficulty.  Tolerating po.  Relaxed breathing.  All questions personally answered at the bedside and all d/c instructions personally reviewed with pt.  Discussed the importance of close outpt. f/u and pt. understands this and agrees to do so.  Pt agrees to return to ED immediately for any new, persistent, or worsening symptoms.    EMR Dragon/Transcription disclaimer:  Much of this encounter note is an electronic transcription/translation of spoken language to printed text, aka voice recognition.  The electronic translation of spoken language may permit erroneous or at times nonsensical words or phrases to be inadvertently transcribed; although I have reviewed the note for such errors, some may still exist so please interpret based on surrounding text content.      Problems Addressed:  Cellulitis of chin: complicated acute illness or injury  Other fatigue: complicated acute illness  or injury    Amount and/or Complexity of Data Reviewed  Labs: ordered.  Radiology: ordered.    Risk  Prescription drug management.        Final diagnoses:   Other fatigue   Cellulitis of chin       ED Disposition  ED Disposition       ED Disposition   Discharge    Condition   Stable    Comment   --               Hetal Causey MD  [address] [facility] [id number] [phone]    In 3 days           Medication List        New Prescriptions      clindamycin 150 MG capsule  Commonly known as: CLEOCIN  Take 3 capsules by mouth Every 8 (Eight) Hours for 7 days.               Where to Get Your Medications        These medications were sent to [facility] [id number] - [place], [place] - [address] [facility] [address] - [phone]  - [phone]   [address] [address]      Phone: [phone]   clindamycin 150 MG capsule            Paxton Brush MD  07/15/24 6831